# Patient Record
Sex: FEMALE | Race: BLACK OR AFRICAN AMERICAN | NOT HISPANIC OR LATINO | Employment: FULL TIME
[De-identification: names, ages, dates, MRNs, and addresses within clinical notes are randomized per-mention and may not be internally consistent; named-entity substitution may affect disease eponyms.]

---

## 2022-06-04 ENCOUNTER — HEALTH MAINTENANCE LETTER (OUTPATIENT)
Age: 32
End: 2022-06-04

## 2022-09-22 ENCOUNTER — OFFICE VISIT (OUTPATIENT)
Dept: OPTOMETRY | Facility: CLINIC | Age: 32
End: 2022-09-22
Payer: COMMERCIAL

## 2022-09-22 DIAGNOSIS — H52.13 MYOPIA OF BOTH EYES: ICD-10-CM

## 2022-09-22 DIAGNOSIS — H52.221 REGULAR ASTIGMATISM OF RIGHT EYE: ICD-10-CM

## 2022-09-22 DIAGNOSIS — Z01.00 ENCOUNTER FOR EXAMINATION OF EYES AND VISION WITHOUT ABNORMAL FINDINGS: Primary | ICD-10-CM

## 2022-09-22 PROCEDURE — 92004 COMPRE OPH EXAM NEW PT 1/>: CPT | Performed by: OPTOMETRIST

## 2022-09-22 PROCEDURE — 92015 DETERMINE REFRACTIVE STATE: CPT | Performed by: OPTOMETRIST

## 2022-09-22 ASSESSMENT — REFRACTION_MANIFEST
OD_AXIS: 012
OD_SPHERE: PLANO
OS_CYLINDER: SPHERE
OD_AXIS: 016
OS_AXIS: 171
OD_CYLINDER: +0.50
OS_SPHERE: +0.25
OD_SPHERE: -0.50
OS_CYLINDER: +0.50
OD_CYLINDER: +0.75
METHOD_AUTOREFRACTION: 1
OS_SPHERE: -0.50

## 2022-09-22 ASSESSMENT — VISUAL ACUITY
OS_SC: 20/20-1
OD_SC: 20/20
METHOD: SNELLEN - LINEAR
OD_SC: 20/25
OS_SC: 20/25
OD_SC+: -1

## 2022-09-22 ASSESSMENT — TONOMETRY
IOP_METHOD: APPLANATION
OD_IOP_MMHG: 20
OS_IOP_MMHG: 20

## 2022-09-22 ASSESSMENT — EXTERNAL EXAM - RIGHT EYE: OD_EXAM: NORMAL

## 2022-09-22 ASSESSMENT — SLIT LAMP EXAM - LIDS
COMMENTS: NORMAL
COMMENTS: NORMAL

## 2022-09-22 ASSESSMENT — KERATOMETRY
OS_K1POWER_DIOPTERS: 44.50
OS_AXISANGLE_DEGREES: 088
OD_K1POWER_DIOPTERS: 45.25
OD_AXISANGLE2_DEGREES: 163
OS_K2POWER_DIOPTERS: 45.00
METHOD_AUTO_MANUAL: AUTOMATED
OD_K2POWER_DIOPTERS: 45.75
OD_AXISANGLE_DEGREES: 073
OS_AXISANGLE2_DEGREES: 178

## 2022-09-22 ASSESSMENT — CUP TO DISC RATIO
OD_RATIO: 0.55
OS_RATIO: 0.5

## 2022-09-22 ASSESSMENT — EXTERNAL EXAM - LEFT EYE: OS_EXAM: NORMAL

## 2022-09-22 ASSESSMENT — CONF VISUAL FIELD
METHOD: COUNTING FINGERS
OS_NORMAL: 1
OD_NORMAL: 1

## 2022-09-22 NOTE — PROGRESS NOTES
"Chief Complaint   Patient presents with     Annual Eye Exam         Last Eye Exam: 1-2 years - [facility]   Dilated Previously: Yes, side effects of dilation explained today - has not had eyes dilated in a long time - will need temp suns    What are you currently using to see?  does not use glasses or contacts - lost glasses ~3 months ago - wore full time        Distance Vision Acuity: Satisfied with vision - notices \"fireworks\" while driving at night    Near Vision Acuity: Not satisfied - black print on white paper fatigues her eyes - makes it harder to focus    Eye Comfort: good  Do you use eye drops? : No  Occupation or Hobbies: Studying for [employer], [employer]     \[facility]\""       Medical, surgical and family histories reviewed and updated 9/22/2022.       OBJECTIVE: See Ophthalmology exam    ASSESSMENT:    ICD-10-CM    1. Encounter for examination of eyes and vision without abnormal findings  Z01.00    2. Myopia of both eyes  H52.13    3. Regular astigmatism of right eye  H52.221        PLAN:     Patient Instructions   France was advised of today's exam findings.  Fill glasses prescription  Allow 2 weeks to adapt to change in glasses  Copy of glasses Rx provided today.    Return in 2 years for eye exam, or sooner if needed.    The effects of the dilating drops last for 4- 6 hours.  You will be more sensitive to light and vision will be blurry up close.  Mydriatic sunglasses were given if needed.       Venkat Singh O.D.  [address] [place]  [address]    [phone]         "

## 2022-09-22 NOTE — PATIENT INSTRUCTIONS
France was advised of today's exam findings.  Fill glasses prescription  Allow 2 weeks to adapt to change in glasses  Copy of glasses Rx provided today.    Return in 2 years for eye exam, or sooner if needed.    The effects of the dilating drops last for 4- 6 hours.  You will be more sensitive to light and vision will be blurry up close.  Mydriatic sunglasses were given if needed.       Venkat Singh O.D.  [facility] [address]  [place]  [address]    [phone]

## 2022-09-22 NOTE — LETTER
"    9/22/2022         RE: France Garcia  [address]  [place] [address]        Dear Colleague,    Thank you for referring your patient, France Garcia, to the [facility]. Please see a copy of my visit note below.    Chief Complaint   Patient presents with     Annual Eye Exam         Last Eye Exam: 1-2 years - [facility]   Dilated Previously: Yes, side effects of dilation explained today - has not had eyes dilated in a long time - will need temp suns    What are you currently using to see?  does not use glasses or contacts - lost glasses ~3 months ago - wore full time        Distance Vision Acuity: Satisfied with vision - notices \"fireworks\" while driving at night    Near Vision Acuity: Not satisfied - black print on white paper fatigues her eyes - makes it harder to focus    Eye Comfort: good  Do you use eye drops? : No  Occupation or Hobbies: Studying for [employer], [employer]     [employer]       Medical, surgical and family histories reviewed and updated 9/22/2022.       OBJECTIVE: See Ophthalmology exam    ASSESSMENT:    ICD-10-CM    1. Encounter for examination of eyes and vision without abnormal findings  Z01.00    2. Myopia of both eyes  H52.13    3. Regular astigmatism of right eye  H52.221        PLAN:     Patient Instructions   France was advised of today's exam findings.  Fill glasses prescription  Allow 2 weeks to adapt to change in glasses  Copy of glasses Rx provided today.    Return in 2 years for eye exam, or sooner if needed.    The effects of the dilating drops last for 4- 6 hours.  You will be more sensitive to light and vision will be blurry up close.  Mydriatic sunglasses were given if needed.       Venkat Singh O.D.  [facility]  [address] [place] [address]. [place], [place]  [id number] [phone]             Again, thank you for allowing me to participate in the care of your patient.        Sincerely,        Venkat Singh, OD    "

## 2022-10-07 ENCOUNTER — NURSE TRIAGE (OUTPATIENT)
Dept: NURSING | Facility: CLINIC | Age: 32
End: 2022-10-07

## 2022-10-07 ENCOUNTER — OFFICE VISIT (OUTPATIENT)
Dept: URGENT CARE | Facility: URGENT CARE | Age: 32
End: 2022-10-07
Payer: COMMERCIAL

## 2022-10-07 VITALS
BODY MASS INDEX: 26.81 KG/M2 | DIASTOLIC BLOOD PRESSURE: 67 MMHG | HEIGHT: 65 IN | TEMPERATURE: 99.8 F | WEIGHT: 160.9 LBS | SYSTOLIC BLOOD PRESSURE: 111 MMHG | OXYGEN SATURATION: 98 % | HEART RATE: 118 BPM

## 2022-10-07 DIAGNOSIS — U07.1 INFECTION DUE TO 2019 NOVEL CORONAVIRUS: Primary | ICD-10-CM

## 2022-10-07 DIAGNOSIS — J45.909 ASTHMA, UNSPECIFIED ASTHMA SEVERITY, UNSPECIFIED WHETHER COMPLICATED, UNSPECIFIED WHETHER PERSISTENT: ICD-10-CM

## 2022-10-07 PROCEDURE — 99204 OFFICE O/P NEW MOD 45 MIN: CPT | Mod: CS | Performed by: PHYSICIAN ASSISTANT

## 2022-10-07 RX ORDER — ACETAMINOPHEN 325 MG/1
325-650 TABLET ORAL EVERY 6 HOURS PRN
COMMUNITY

## 2022-10-07 RX ORDER — ALBUTEROL SULFATE 90 UG/1
1-2 AEROSOL, METERED RESPIRATORY (INHALATION) EVERY 6 HOURS PRN
Qty: 18 G | Refills: 0 | Status: SHIPPED | OUTPATIENT
Start: 2022-10-07

## 2022-10-07 NOTE — PROGRESS NOTES
Chief Complaint   Patient presents with     Suspected Covid     Covid Positive.      Fever     Generalized Body Aches                     ASSESSMENT:     ICD-10-CM    1. Infection due to 2019 novel coronavirus  U07.1 nirmatrelvir and ritonavir (PAXLOVID) therapy pack     albuterol (PROAIR HFA/PROVENTIL HFA/VENTOLIN HFA) 108 (90 Base) MCG/ACT inhaler   2. Asthma, unspecified asthma severity, unspecified whether complicated, unspecified whether persistent  J45.909 nirmatrelvir and ritonavir (PAXLOVID) therapy pack     albuterol (PROAIR HFA/PROVENTIL HFA/VENTOLIN HFA) 108 (90 Base) MCG/ACT inhaler           PLAN: Positive COVID and asthma patient with current wheezing.  Discussed risks and/or benefits of Paxlovid treatment.  Wishes to proceed.  Denies any history of liver or kidney issues.  Prescription for it sent to [facility] pharmacy.  Also prescription for albuterol inhaler.  Side effects of medications discussed.  Symptomatic care is discussed.  I have discussed the possibility of  worsening symptoms and indication to RTC or go to the ER if they occur.  All questions are answered, patient indicates understanding of these issues and is in agreement with plan.   Patient care instructions are discussed/given at the end of visit.   Lots of rest and fluids.      Janice Iniguez PA-C      SUBJECTIVE:  32-year-old female vaccinated against COVID presents for a cute onset of fever, body aches last night.  Positive home COVID test today.  History of asthma.  Does not currently have an inhaler.  Does hear wheezing in her lungs.      Allergies   Allergen Reactions     Penicillins Rash and Unknown     Other reaction(s): Edema       No past medical history on file.    acetaminophen (TYLENOL) 325 MG tablet, Take 325-650 mg by mouth every 6 hours as needed for mild pain    No current facility-administered medications on file prior to visit.      Social History     Tobacco Use     Smoking status: Light Tobacco Smoker  "    Types: Cigars     Smokeless tobacco: Never Used   Substance Use Topics     Alcohol use: Yes     Comment: Social       ROS:  CONSTITUTIONAL: Negative for fatigue or fever.  EYES: Negative for eye problems.  ENT: As above.  RESP: As above.  CV: Negative for chest pains.  GI: Negative for vomiting.  MUSCULOSKELETAL:  Negative for significant muscle or joint pains.  NEUROLOGIC: Negative for headaches.  SKIN: Negative for rash.  PSYCH: Normal mentation for age.    OBJECTIVE:  /67 (BP Location: Left arm, Patient Position: Sitting, Cuff Size: Adult Regular)   Pulse 118   Temp 99.8  F (37.7  C) (Tympanic)   Ht 1.651 m (5' 5\")   Wt 73 kg (160 lb 14.4 oz)   SpO2 98%   BMI 26.78 kg/m    GENERAL APPEARANCE: Healthy, alert and no distress.  EYES:Conjunctiva/sclera clear.  EARS: No cerumen.   Ear canals w/o erythema.  TM's intact w/o erythema.    NOSE/MOUTH: Nose without ulcers, erythema or lesions.  SINUSES: No maxillary sinus tenderness.  THROAT: No erythema w/o tonsillar enlargement . No exudates.  NECK: Supple, nontender, no lymphadenopathy.  RESP: Lungs with expiratory wheezes throughout  CV: Regular rate and rhythm, normal S1 S2, no murmur noted.  NEURO: Awake, alert    SKIN: No rashes        Janice Iniguez PA-C    "

## 2022-10-07 NOTE — TELEPHONE ENCOUNTER
Symptoms started yesterday    Is having chest pain and pressure is also feeling short of breath    Is feeling short of breath when walking around - ok when sitting    Chest pain is about a 4/10 - almost like an asthma attack - really tight - on the verge of not being able to catch her breath all the tight    Color looks fine - no blue or gray face or lips    Advised that she be seen in [facility] [place]    Guerda Yang RN  [place] Nurse Advisor  1:23 PM  10/7/2022        Reason for Disposition    Chest pain or pressure  (Exception: MILD central chest pain, present only when coughing)    Additional Information    Negative: SEVERE difficulty breathing (e.g., struggling for each breath, speaks in single words)    Negative: Difficult to awaken or acting confused (e.g., disoriented, slurred speech)    Negative: Bluish (or gray) lips or face now    Negative: Shock suspected (e.g., cold/pale/clammy skin, too weak to stand, low BP, rapid pulse)    Negative: Sounds like a life-threatening emergency to the triager    Negative: SEVERE or constant chest pain or pressure  (Exception: Mild central chest pain, present only when coughing.)    Negative: MODERATE difficulty breathing (e.g., speaks in phrases, SOB even at rest, pulse 100-120)    Negative: Headache and stiff neck (can't touch chin to chest)    Negative: Oxygen level (e.g., pulse oximetry) 90 percent or lower    Protocols used: CORONAVIRUS (COVID-19) DIAGNOSED OR [id number]

## 2022-10-08 NOTE — TELEPHONE ENCOUNTER
Nurse Triage SBAR    Is this a 2nd Level Triage? NO    Situation: France states she is concerned as she has had a temperature fluctuating between 99.8-102.6.     Background: France called stating she just came home from the pharmacy with a prescription for PAXLOVID after a dx of COVID.     Assessment:  She is c/o a fever, intermittent cough, chills, stiff neck, weakness, and a HA. Her current temp is 102.1.    Protocol Recommended Disposition:   Home Care, See More Appropriate Guideline    Recommendation: Dispo for home care. Care advice discussed with pt who states understanding. Also discussed when to come back in to the hospital or call back.    Reason for Disposition    [1] Fever AND [2] within 14 days of COVID-19 Exposure    [1] Fever AND [2] no signs of serious infection or localizing symptoms (all other triage questions negative)    [1] COVID-19 diagnosed by positive lab test (e.g., PCR, rapid self-test kit) AND [2] mild symptoms (e.g., cough, fever, others) AND [3] no complications or SOB    Additional Information    Negative: Sounds like a life-threatening emergency to the triager    Negative: Shock suspected (e.g., cold/pale/clammy skin, too weak to stand, low BP, rapid pulse)    Negative: Difficult to awaken or acting confused (e.g., disoriented, slurred speech)    Negative: [1] Difficulty breathing AND [2] bluish lips, tongue or face    Negative: New-onset rash with multiple purple (or blood-colored) spots or dots    Negative: [1] Headache AND [2] stiff neck (can't touch chin to chest)    Negative: Difficulty breathing    Negative: IV Drug Use (IVDU)    Negative: [1] Drinking very little AND [2] dehydration suspected (e.g., no urine > 12 hours, very dry mouth, very lightheaded)    Negative: Widespread rash and cause unknown    Negative: Patient sounds very sick or weak to the triager  (Exception: mild weakness and hasn't taken fever medicine)    Negative: Fever > 104 F (40 C)    Negative: [1] Fever > 101  F (38.3 C) AND [2] age > 60 years    Negative: [1] Fever > 100.0 F (37.8 C) AND [2] bedridden (e.g., nursing home patient, CVA, chronic illness, recovering from surgery)    Negative: [1] Fever > 100.0 F (37.8 C) AND [2] indwelling urinary catheter (e.g., Jesus, Coude)    Negative: [1] Fever > 100.0 F (37.8 C) AND [2] has port (portacath), central line, or PICC line    Negative: [1] Fever > 100.0 F (37.8 C) AND [2] diabetes mellitus or weak immune system (e.g., HIV positive, cancer chemo, splenectomy, organ transplant, chronic steroids)    Negative: [1] Fever > 100.0 F (37.8 C) AND [2] surgery in the last month    Negative: Transplant patient (e.g., kidney, liver, lung, heart)    Negative: Severe chills (i.e., feeling extremely cold WITH shaking chills)    Negative: Fever present > 3 days (72 hours)    Negative: [1] Fever > 100.0 F (37.8 C) AND [2] foreign travel to a developing country in the last month    Negative: [1] Intermittent fever > 100.0 F (37.8 C) AND [2] lasts > 3 weeks    Negative: SEVERE difficulty breathing (e.g., struggling for each breath, speaks in single words)    Negative: Difficult to awaken or acting confused (e.g., disoriented, slurred speech)    Negative: Bluish (or gray) lips or face now    Negative: Shock suspected (e.g., cold/pale/clammy skin, too weak to stand, low BP, rapid pulse)    Negative: Sounds like a life-threatening emergency to the triager    Negative: SEVERE or constant chest pain or pressure  (Exception: Mild central chest pain, present only when coughing.)    Negative: MODERATE difficulty breathing (e.g., speaks in phrases, SOB even at rest, pulse 100-120)    Negative: [1] Headache AND [2] stiff neck (can't touch chin to chest)    Negative: Oxygen level (e.g., pulse oximetry) 90 percent or lower    Negative: Chest pain or pressure    Negative: Patient sounds very sick or weak to the triager    Negative: MILD difficulty breathing (e.g., minimal/no SOB at rest, SOB with walking,  pulse <100)    Negative: Fever > 103 F (39.4 C)    Negative: [1] Fever > 101 F (38.3 C) AND [2] age > 60 years    Negative: [1] Fever > 100.0 F (37.8 C) AND [2] bedridden (e.g., nursing home patient, CVA, chronic illness, recovering from surgery)    Negative: HIGH RISK for severe COVID complications (e.g., weak immune system, age > 64 years, obesity with BMI > 25, pregnant, chronic lung disease or other chronic medical condition)  (Exception: Already seen by PCP and no new or worsening symptoms.)    Negative: [1] HIGH RISK patient AND [2] influenza is widespread in the community AND [3] ONE OR MORE respiratory symptoms: cough, sore throat, runny or stuffy nose    Negative: [1] HIGH RISK patient AND [2] influenza exposure within the last 7 days AND [3] ONE OR MORE respiratory symptoms: cough, sore throat, runny or stuffy nose    Negative: Oxygen level (e.g., pulse oximetry) 91 to 94 percent    Negative: Fever present > 3 days (72 hours)    Negative: [1] Fever returns after gone for over 24 hours AND [2] symptoms worse or not improved    Negative: [1] Continuous (nonstop) coughing interferes with work or school AND [2] no improvement using cough treatment per Care Advice    Negative: [1] COVID-19 infection suspected by caller or triager AND [2] mild symptoms (cough, fever, or others) AND [3] negative COVID-19 rapid test    Negative: Cough present > 3 weeks    Protocols used: FEVER-A-AH, CORONAVIRUS (COVID-19) DIAGNOSED OR [id number] 1.18.2022    Hazel Suarez RN  [facility] Nurse Advisor   10/7/2022  7:34 PM

## 2022-10-10 ENCOUNTER — HEALTH MAINTENANCE LETTER (OUTPATIENT)
Age: 32
End: 2022-10-10

## 2023-02-28 ENCOUNTER — E-VISIT (OUTPATIENT)
Dept: FAMILY MEDICINE | Facility: CLINIC | Age: 33
End: 2023-02-28

## 2023-02-28 DIAGNOSIS — B37.31 CANDIDAL VULVOVAGINITIS: Primary | ICD-10-CM

## 2023-02-28 PROCEDURE — 99421 OL DIG E/M SVC 5-10 MIN: CPT | Performed by: PHYSICIAN ASSISTANT

## 2023-02-28 RX ORDER — FLUCONAZOLE 150 MG/1
150 TABLET ORAL ONCE
Qty: 1 TABLET | Refills: 0 | Status: SHIPPED | OUTPATIENT
Start: 2023-02-28 | End: 2023-02-28

## 2023-02-28 NOTE — PATIENT INSTRUCTIONS
Thank you for choosing us for your care. I have placed an order for a prescription so that you can start treatment. View your full visit summary for details by clicking on the link below. Your pharmacist will able to address any questions you may have about the medication.     If you re not feeling better within 2-3 days, please schedule an appointment.  You can [place] right [place], or call [phone]  If the visit is for the same symptoms as your eVisit, we ll refund the cost of your eVisit if seen within seven days.      Yeast Infection (Candida Vaginal Infection)    You have a Candida vaginal infection. This is also known as a yeast infection. It's most often caused by a type of yeast (fungus) called Candida. Candida are normally found in the vagina. But if they increase in number, this can lead to infection and cause symptoms.   Symptoms of a yeast infection can include:     Clumpy or thin, white discharge, which may look like cottage cheese    Itching or burning    Burning with urination  Certain factors can make a yeast infection more likely. These can include:     Taking certain medicines, such as antibiotics or birth control pills    Pregnancy    Diabetes    Weak immune system  A yeast infection is most often treated with antifungal medicine. This may be given as a vaginal cream or pills you take by mouth. Treatment may last for about 1 to 7 days. Women with severe or recurrent infections may need longer courses of treatment.   Home care    If you re prescribed medicine, be sure to use it as directed. Finish all of the medicine, even if your symptoms go away. Don t try to treat yourself using over-the-counter products without talking with your provider first. They will let you know if this is a good option for you.    Ask your provider what steps you can take to help reduce your risk of having a yeast infection in the future.    Follow-up care  Follow up with your healthcare  provider, or as directed.   When to seek medical advice  Call your healthcare provider right away if:     You have a fever of 100.4 F (38 C) or higher, or as directed by your provider.    Your symptoms worsen, or they don t go away within a few days of starting treatment.    You have new pain in the lower belly or pelvic region.    You have side effects that bother you or a reaction to the cream or pills you re prescribed.    You or any partners you have sex with have new symptoms, such as a rash, joint pain, or sores.  [employer] last reviewed this educational content on 7/1/2020 2000-2021 [employer]. All rights reserved. This information is not intended as a substitute for professional medical care. Always follow your healthcare professional's instructions.

## 2023-06-10 ENCOUNTER — HEALTH MAINTENANCE LETTER (OUTPATIENT)
Age: 33
End: 2023-06-10

## 2024-07-16 ENCOUNTER — DOCUMENTATION ONLY (OUTPATIENT)
Dept: TRANSPLANT | Facility: CLINIC | Age: 34
End: 2024-07-16

## 2024-07-16 ENCOUNTER — TELEPHONE (OUTPATIENT)
Dept: TRANSPLANT | Facility: CLINIC | Age: 34
End: 2024-07-16

## 2024-07-16 NOTE — TELEPHONE ENCOUNTER
"Donor Intake Start:5/3/24Donor Intake Complete:5/3/24  Expiration Date:24  Gender:FemalePreferred Language:English  Full Name:France Medrano  Needed:[not answered]  Preferred Name:France  Phone Number:[phone] Phone:  Contact Preference:[not answered]Best Contact Time:Noon - 2pm  Emergency Contact:Abhijit AgustinMichelle Contact #:[phone]  Relationship to Contact:Contact is my spouse  :[birth year]Age:33  Country:[place]  [address] [address] [place]  [id number]  Height:5'5\"Weight:165lbs  BMI:27.5  Employment Status:EmployedHas PTO for donation?No, not reimbursed  Occupation:School bus driverRequires Heavy Lifting?  No  Education Level:High SchoolMarital Status:  Exercise Routine:OccasionalHealth Insurance:  Yes  Blood Type:UnknownEthnicity/Race:Black or African American  Donor Type:Standard Voucher Donor  Prefer Remote Donation:[not answered]  Physician:N/A  KOSTAS Arceo  Motivation to donate:  It's my mother. I will do anything to make sure she is well  Living Donor Pre-Screening  Is In [place]  Yes  Will Accept Blood Transfusions?  Yes  Has been Diagnosed with Kidney Disease?  No  Has had a Heart Attack?  No  Has Diabetes?  No  Has had Cancer?  No  Has had Kidney Stones?  No  Has ever been Pregnant?  Yes    - Is Currently Pregnant?  No    - Months Since Pregnancy?24+    - Is Currently Nursing?  No    - Gestational Diabetes?  No    - Hypertension during pregnancy?  Never  Is Planning on Pregnancy?  No  Is Taking Birth Control?  No  Has Used Tobacco  Yes    - Currently uses Tobacco?  Yes    - Will Stop for Surgery?  Yes    - How Many Years:15    - Tobacco use (packs/cans) / Frequency:1 / Weekly  Has HIV?  No  Is Currently Incarcerated?  No  Is Currently Residing in [place]  Yes  History Misc  Has Allergies?  Yes  Allergy  Penicillin  Pears  Has had Surgeries?  Yes  Surgery When  ACL replacement 2007  Appendix removal 2002  Takes " Medication?  No  Medical History  History of High BP?  Never  Has History Of CABG (bypass surgery)?  No  History of Blood Clots?  Unknown  History of Coronary Disease?  Never  Has Stents Implanted?  No  Has History of Chest Pain with Exercise?  No  Has History of Chest Pain at Other Times?  No  Results of Climbing 2 Flights of Stairs?No Problem  Has had Stress Test within Last Year?  No  Has had Stroke?  No  Has had Leg Bypass?  No  History of Lung Disease?  Never  History of COPD?  Never  History of TB?  Never    - Is TB Active?[not answered]  History of Pneumonia?  Never  Has Respiratory Issues?  Yes    - Respiratory Issues:Asthma  Has Gastro Issues?  No  History of Gallstones?  Never  History of Pancreatitis?  Never  History of Liver Disease?  Never  History of Hepatitis B?  Never    - Is Hep B Active?[not answered]  History of Hepatitis C?  Never  History of Bleeding Problem?  Never  History of UTIs?  Unknown  History of Kidney Damage?  Never  History of Proteinuria?  Never  History of Hematuria?  Never  History of Neuro Disease?  Never  History of Seizure?  Never  History of Lupus?  Never  History of Paralysis?  Never  History of Arthritis?  Never  History of Neuropathy?  Never  History of Depression?  Never  History of Anxiety?  Never  History of Documented Psychiatric Illness?  Never  History of Fibroid Uterus?  Never  History of Endometriosis?  Never  History of Polycystic Ovaries?  Never  Has had Miscarriages?  Yes    - # of Miscarriages:1    - Had Late Term Miscarriage?  No  Has had Abortions?  No  Has had Transfusions?  Unknown  History of Obesity?  No  History of Fabry's Disease?  No  History of Sickle Cell Disease?  No  History of Sickle Cell Trait?  No  History of Sarcoidosis?  No  History of Auto-Immune Disease  No  Has had Physical Exam?  Yes    - how many years ago:1  Has had Mammogram?  No    - how many years ago:  Has had Pap Smear?  Yes    - how many years ago:3  Has had Colonoscopy?  No  Medical  History Comments?[no comments]  Living Donor Family Medical History  Anyone with kidney disease?  Unknown  Anyone with liver disease?  Unknown  Anyone with heart disease?  Unknown  Anyone with coronary artery disease?  Unknown  Anyone with high blood pressure?  Unknown  Anyone with blood disorder?  Unknown  Anyone with cancer?  Yes    - which family members:Grandfather  Anyone with kidney cancer?  Unknown  Anyone with diabetes?  Yes    - which family members:Grandmother  Is mother alive?  Yes  Mother's age?56  Is father alive?  Yes  Father's age?60  How many siblings?3  How many adult children?0  How many children under 18?0  Social History  Has Used Alcohol?  Yes    - currently uses alcohol:  Yes    - how much:2/Daily  Has Abused Alcohol?  No  Has Used Drugs?  Yes  Drugs UsedLast UsedGot Treatment?  [id number]+ years  Yes     Has had legal issues w/ law enforcement?  Yes  Has been sentenced to penitentiary?  No  Traveled over 100 miles from home in last year?  Yes    - Traveled Where?[place]  Has had suicidal thoughts or attempts in the last five years?  No

## 2024-07-18 ENCOUNTER — TELEPHONE (OUTPATIENT)
Dept: TRANSPLANT | Facility: CLINIC | Age: 34
End: 2024-07-18

## 2024-07-18 NOTE — TELEPHONE ENCOUNTER
Initial Independent Living Donor Advocate contact attempted today.  No answer.  Left VM to contact the transplant center to reschedule if she remains interested in being evaluated as a kidney donor for her mother.

## 2024-07-19 ENCOUNTER — DOCUMENTATION ONLY (OUTPATIENT)
Dept: TRANSPLANT | Facility: CLINIC | Age: 34
End: 2024-07-19

## 2024-08-03 ENCOUNTER — HEALTH MAINTENANCE LETTER (OUTPATIENT)
Age: 34
End: 2024-08-03

## 2024-12-12 ENCOUNTER — DOCUMENTATION ONLY (OUTPATIENT)
Dept: TRANSPLANT | Facility: CLINIC | Age: 34
End: 2024-12-12

## 2024-12-16 ENCOUNTER — DOCUMENTATION ONLY (OUTPATIENT)
Dept: TRANSPLANT | Facility: CLINIC | Age: 34
End: 2024-12-16

## 2024-12-18 ENCOUNTER — TELEPHONE (OUTPATIENT)
Dept: TRANSPLANT | Facility: CLINIC | Age: 34
End: 2024-12-18

## 2024-12-18 NOTE — TELEPHONE ENCOUNTER
Initial Independent Living Donor Advocate contact made with potential donor today.  I introduced myself and my role during the donation process, including:   INDERJIT ROLE   The federal government requires that all licensed transplant centers provide the living donor with an Independent Living Donor Advocate (INDERJIT).  I do not meet recipients or attend meetings that discuss their care or decision to transplant them. My role is separate to avoid any conflict of interest.  My role is to ensure:  1) your rights are protected;  2) you get all the information you need from the transplant team to make a fully informed decision whether to donate;   3) that living donation is in your best interest.   4) that you have the right to decide NOT to go forward with living donation at any time during this process.  I am available to you throughout the workup, during surgery phase and follow-up at home.     WORKUP & PRIVACY   Your identity and workup are not shared with the recipient at any time.   The recipient's insurance covers the medical expenses related to the donor evaluation and surgery.  However, it is important that you carry your own health insurance to address any medical issues that are found and are NOT related to living donation.  Additionally, age appropriate cancer screening (I.e. mammograms,  colonoscopies, etc) is required and would be through your insurance.  There is a psychosocial and medical donor workup that consists of testing to determine if you are healthy enough to donate. Workup tests include tissue typing/genetics, many blood draws, urine collection/ (kidney function testing), chest x-ray, EKG/other heart testing, CT scan. Age appropriate cancer screening is required and would be through your insurance. As you complete each step then you may move on to the next.  Workup can take as little or as long as you need and you can stop the process at any time. Transplant is a treatment option, not a cure. A kidney  from a living kidney donor can last 12-14 years.  Other treatment options are  donation and two types of dialysis.   This is major surgery and your estimated hospital stay is approximately 1-2 nights.  After surgery, there are driving and lifting restrictions - no driving for two weeks and no lifting over ten pounds for 8 weeks.  Donors are routinely off from work for 4 - 6 weeks after surgery, and potentially longer if they have a physical job.     If you anticipate lost wages due to donation, donor wage reimbursement options may be available to you and will be reviewed with you during the evaluation process. Donor Shield and NLDAC explained.  We reviewed the importance of completing follow-up labs and surveys at six months, 1 year and 2 years after donation to monitor kidney health and the impact donation has had on their life post donation.        QUESTIONS    Have you received the Welcome e-mail that includes copies of the informed consent, financial letter, information on donor shield and NLDAC from the transplant department? Yes.    Have you discussed with anyone your potential decision to donate?   Yes.    Is anyone pressuring or coercing you to donate? No.    Have you discussed any financial arrangements with recipient around donating a kidney? No.    Are you aware that you can confidentially opt out at any time, up to and including day of donation? Yes.    At this time, would you like to proceed with the medical evaluation to see if you can be a kidney donor?  Yes.    If yes, I will make an appointment for your donor coordinator to reach out to you with next steps.     Contact information for INDERJIT's was provided Yes.    Demographic Information:   Preferred Name: France  Preferred Pronouns:she/her  Race/Ethnicity: Black or     Peg Newton- [phone]  Emily Harris-  [phone]    Confirmed that France Garcia reviewed Informed consent document and all questions answered.   Reviewed that they will receive Docusign to obtain electronic signature for the following: Informed consent, SRTR data, JESSICA for medical information, Auth for Electronic communication, Kidney [facility] Life and will need their signed consent back before proceeding with evaluation.     Time frame for donation: tbd  Paired exchange was introduced  Yes.      MyChart was initiated  Yes.  CareEverywhere was initiated Yes.    INDERJIT NOTES: France is interested in donating a kidney to her mother.  Wife supportive.  Would like more information about pregnancy after donation.  She is a .  Appt with Robyn Ying scheduled for Monday, 1/23/24 at 1:45 PM      Duration of call 35 minutes

## 2024-12-23 ENCOUNTER — TELEPHONE (OUTPATIENT)
Dept: TRANSPLANT | Facility: CLINIC | Age: 34
End: 2024-12-23

## 2025-05-14 SDOH — HEALTH STABILITY: PHYSICAL HEALTH: ON AVERAGE, HOW MANY DAYS PER WEEK DO YOU ENGAGE IN MODERATE TO STRENUOUS EXERCISE (LIKE A BRISK WALK)?: 0 DAYS

## 2025-05-14 SDOH — HEALTH STABILITY: PHYSICAL HEALTH: ON AVERAGE, HOW MANY MINUTES DO YOU ENGAGE IN EXERCISE AT THIS LEVEL?: 0 MIN

## 2025-05-14 ASSESSMENT — ASTHMA QUESTIONNAIRES
QUESTION_2 LAST FOUR WEEKS HOW OFTEN HAVE YOU HAD SHORTNESS OF BREATH: THREE TO SIX TIMES A WEEK
QUESTION_3 LAST FOUR WEEKS HOW OFTEN DID YOUR ASTHMA SYMPTOMS (WHEEZING, COUGHING, SHORTNESS OF BREATH, CHEST TIGHTNESS OR PAIN) WAKE YOU UP AT NIGHT OR EARLIER THAN USUAL IN THE MORNING: TWO OR THREE NIGHTS A WEEK
QUESTION_1 LAST FOUR WEEKS HOW MUCH OF THE TIME DID YOUR ASTHMA KEEP YOU FROM GETTING AS MUCH DONE AT WORK, SCHOOL OR AT HOME: SOME OF THE TIME
ACT_TOTALSCORE: 12
QUESTION_5 LAST FOUR WEEKS HOW WOULD YOU RATE YOUR ASTHMA CONTROL: SOMEWHAT CONTROLLED
EMERGENCY_ROOM_LAST_YEAR_TOTAL: ONE
QUESTION_4 LAST FOUR WEEKS HOW OFTEN HAVE YOU USED YOUR RESCUE INHALER OR NEBULIZER MEDICATION (SUCH AS ALBUTEROL): THREE OR MORE TIMES PER DAY

## 2025-05-14 ASSESSMENT — SOCIAL DETERMINANTS OF HEALTH (SDOH): HOW OFTEN DO YOU GET TOGETHER WITH FRIENDS OR RELATIVES?: ONCE A WEEK

## 2025-05-19 ENCOUNTER — OFFICE VISIT (OUTPATIENT)
Dept: FAMILY MEDICINE | Facility: CLINIC | Age: 35
End: 2025-05-19
Payer: COMMERCIAL

## 2025-05-19 VITALS
WEIGHT: 185.2 LBS | DIASTOLIC BLOOD PRESSURE: 87 MMHG | TEMPERATURE: 98.4 F | RESPIRATION RATE: 16 BRPM | SYSTOLIC BLOOD PRESSURE: 125 MMHG | HEIGHT: 65 IN | OXYGEN SATURATION: 100 % | HEART RATE: 82 BPM | BODY MASS INDEX: 30.86 KG/M2

## 2025-05-19 DIAGNOSIS — Z13.1 SCREENING FOR DIABETES MELLITUS: ICD-10-CM

## 2025-05-19 DIAGNOSIS — Z12.4 CERVICAL CANCER SCREENING: ICD-10-CM

## 2025-05-19 DIAGNOSIS — Z11.59 NEED FOR HEPATITIS C SCREENING TEST: ICD-10-CM

## 2025-05-19 DIAGNOSIS — Z11.3 SCREENING EXAMINATION FOR STI: ICD-10-CM

## 2025-05-19 DIAGNOSIS — N63.11 MASS OF UPPER OUTER QUADRANT OF RIGHT BREAST: ICD-10-CM

## 2025-05-19 DIAGNOSIS — Z00.00 ROUTINE GENERAL MEDICAL EXAMINATION AT A HEALTH CARE FACILITY: Primary | ICD-10-CM

## 2025-05-19 DIAGNOSIS — Z11.4 SCREENING FOR HIV (HUMAN IMMUNODEFICIENCY VIRUS): ICD-10-CM

## 2025-05-19 DIAGNOSIS — J45.21 MILD INTERMITTENT ASTHMA WITH ACUTE EXACERBATION: ICD-10-CM

## 2025-05-19 DIAGNOSIS — Z13.220 LIPID SCREENING: ICD-10-CM

## 2025-05-19 LAB
ALBUMIN SERPL BCG-MCNC: 4.1 G/DL (ref 3.5–5.2)
ALP SERPL-CCNC: 88 U/L (ref 40–150)
ALT SERPL W P-5'-P-CCNC: 31 U/L (ref 0–50)
ANION GAP SERPL CALCULATED.3IONS-SCNC: 10 MMOL/L (ref 7–15)
AST SERPL W P-5'-P-CCNC: 35 U/L (ref 0–45)
BILIRUB SERPL-MCNC: 0.2 MG/DL
BUN SERPL-MCNC: 5.6 MG/DL (ref 6–20)
CALCIUM SERPL-MCNC: 9.5 MG/DL (ref 8.8–10.4)
CHLORIDE SERPL-SCNC: 104 MMOL/L (ref 98–107)
CHOLEST SERPL-MCNC: 160 MG/DL
CREAT SERPL-MCNC: 0.74 MG/DL (ref 0.51–0.95)
EGFRCR SERPLBLD CKD-EPI 2021: >90 ML/MIN/1.73M2
FASTING STATUS PATIENT QL REPORTED: NO
FASTING STATUS PATIENT QL REPORTED: NO
GLUCOSE SERPL-MCNC: 88 MG/DL (ref 70–99)
HBV SURFACE AG SERPL QL IA: NONREACTIVE
HCO3 SERPL-SCNC: 24 MMOL/L (ref 22–29)
HCV AB SERPL QL IA: NONREACTIVE
HDLC SERPL-MCNC: 44 MG/DL
HIV 1+2 AB+HIV1 P24 AG SERPL QL IA: NONREACTIVE
LDLC SERPL CALC-MCNC: 89 MG/DL
NONHDLC SERPL-MCNC: 116 MG/DL
POTASSIUM SERPL-SCNC: 4.1 MMOL/L (ref 3.4–5.3)
PROT SERPL-MCNC: 7.5 G/DL (ref 6.4–8.3)
SODIUM SERPL-SCNC: 138 MMOL/L (ref 135–145)
T PALLIDUM AB SER QL: NONREACTIVE
TRIGL SERPL-MCNC: 137 MG/DL

## 2025-05-19 PROCEDURE — 36415 COLL VENOUS BLD VENIPUNCTURE: CPT

## 2025-05-19 PROCEDURE — 80061 LIPID PANEL: CPT

## 2025-05-19 PROCEDURE — 3079F DIAST BP 80-89 MM HG: CPT

## 2025-05-19 PROCEDURE — 87389 HIV-1 AG W/HIV-1&-2 AB AG IA: CPT

## 2025-05-19 PROCEDURE — 3074F SYST BP LT 130 MM HG: CPT

## 2025-05-19 PROCEDURE — 86780 TREPONEMA PALLIDUM: CPT

## 2025-05-19 PROCEDURE — 99213 OFFICE O/P EST LOW 20 MIN: CPT | Mod: 25

## 2025-05-19 PROCEDURE — 80053 COMPREHEN METABOLIC PANEL: CPT

## 2025-05-19 PROCEDURE — 90677 PCV20 VACCINE IM: CPT

## 2025-05-19 PROCEDURE — G2211 COMPLEX E/M VISIT ADD ON: HCPCS

## 2025-05-19 PROCEDURE — 86803 HEPATITIS C AB TEST: CPT

## 2025-05-19 PROCEDURE — 99395 PREV VISIT EST AGE 18-39: CPT | Mod: 25

## 2025-05-19 PROCEDURE — 90471 IMMUNIZATION ADMIN: CPT

## 2025-05-19 PROCEDURE — 87340 HEPATITIS B SURFACE AG IA: CPT

## 2025-05-19 RX ORDER — AZITHROMYCIN 250 MG/1
TABLET, FILM COATED ORAL
COMMUNITY
Start: 2025-05-16

## 2025-05-19 ASSESSMENT — ANXIETY QUESTIONNAIRES
GAD7 TOTAL SCORE: 1
5. BEING SO RESTLESS THAT IT IS HARD TO SIT STILL: NOT AT ALL
GAD7 TOTAL SCORE: 1
GAD7 TOTAL SCORE: 1
3. WORRYING TOO MUCH ABOUT DIFFERENT THINGS: SEVERAL DAYS
IF YOU CHECKED OFF ANY PROBLEMS ON THIS QUESTIONNAIRE, HOW DIFFICULT HAVE THESE PROBLEMS MADE IT FOR YOU TO DO YOUR WORK, TAKE CARE OF THINGS AT HOME, OR GET ALONG WITH OTHER PEOPLE: NOT DIFFICULT AT ALL
7. FEELING AFRAID AS IF SOMETHING AWFUL MIGHT HAPPEN: NOT AT ALL
4. TROUBLE RELAXING: NOT AT ALL
6. BECOMING EASILY ANNOYED OR IRRITABLE: NOT AT ALL
7. FEELING AFRAID AS IF SOMETHING AWFUL MIGHT HAPPEN: NOT AT ALL
1. FEELING NERVOUS, ANXIOUS, OR ON EDGE: NOT AT ALL
2. NOT BEING ABLE TO STOP OR CONTROL WORRYING: NOT AT ALL
8. IF YOU CHECKED OFF ANY PROBLEMS, HOW DIFFICULT HAVE THESE MADE IT FOR YOU TO DO YOUR WORK, TAKE CARE OF THINGS AT HOME, OR GET ALONG WITH OTHER PEOPLE?: NOT DIFFICULT AT ALL

## 2025-05-19 NOTE — PROGRESS NOTES
Preventive Care Visit  [facility] Revel[facility] CNP, Family Medicine  May 19, 2025      Assessment & Plan     Routine general medical examination at a health care facility    Mild intermittent asthma with acute exacerbation  Well controlled, currently recovering from exacerbation 2/2 pneumonia   -Continue medications as previously prescribed     Mass of upper outer quadrant of right breast  Recently noted.  - MA Diagnostic Bilateral w/ Víctor  - US Breast Right Complete 4 Quadrants    Screening for diabetes mellitus  - Comprehensive metabolic panel (BMP + Alb, Alk Phos, ALT, AST, Total. Bili, TP)    Cervical cancer screening  Pt declines today. Agrees to reschedule     Routine Screening examination for STI  - Chlamydia trachomatis PCR Urine  - Neisseria gonorrhoeae PCR Urine [[id number]]  - Hepatitis B surface antigen  - Hepatitis C Screen Reflex to HCV RNA Quant and Genotype  - HIV Antigen Antibody Combo Cascade  - Treponema Abs w Reflex to RPR and Titer    Lipid screening  - Lipid panel reflex to direct LDL Non-fasting    Counseling  Appropriate preventive services were addressed with this patient via screening, questionnaire, or discussion as appropriate for fall prevention, nutrition, physical activity, Tobacco-use cessation, social engagement, weight loss and cognition.  Checklist reviewing preventive services available has been given to the patient.  Reviewed patient's diet, addressing concerns and/or questions.     Follow-up for routine Pap as discussed      Subjective   France is a 34 year old, presenting for the following:  Physical (Annual/Felt a lump in rt breast)    Patient presents for routine annual.  Largely doing well.  Reports mental health doing well.  Currently trying to conceive with donor, would like routine STI screening.    Patient acutely concerned for breast lump.  Noted to right side upper outer quadrant.  Nontender.  No overlying skin change or nipple discharge.   Reports it may have strength since the start of her menstrual cycle but is acutely concerned as she has had several members with breast cancer.        5/19/2025    10:21 AM   Additional Questions   Roomed by cayla            Advance Care Planning    Discussed advance care planning with patient; however, patient declined at this time.        5/14/2025   General Health   How would you rate your overall physical health? Good   Feel stress (tense, anxious, or unable to sleep) Not at all         5/14/2025   Nutrition   Three or more servings of calcium each day? Yes   Diet: Regular (no restrictions)   How many servings of fruit and vegetables per day? 4 or more   How many sweetened beverages each day? (!) 2         5/14/2025   Exercise   Days per week of moderate/strenous exercise 0 days   Average minutes spent exercising at this level 0 min         5/14/2025   Social Factors   Frequency of gathering with friends or relatives Once a week   Worry food won't last until get money to buy more No   Food not last or not have enough money for food? No   Do you have housing? (Housing is defined as stable permanent housing and does not include staying outside in a car, in a tent, in an abandoned building, in an overnight shelter, or couch-surfing.) Yes   Are you worried about losing your housing? No   Lack of transportation? No   Unable to get utilities (heat,electricity)? No         5/14/2025   Dental   Dentist two times every year? Yes         Today's PHQ-2 Score:       5/19/2025     9:24 AM   PHQ-2 ( 1999 [employer])   Q1: Little interest or pleasure in doing things 0   Q2: Feeling down, depressed or hopeless 0   PHQ-2 Score 0    Q1: Little interest or pleasure in doing things Not at all   Q2: Feeling down, depressed or hopeless Not at all   PHQ-2 Score 0       Patient-reported           5/14/2025   Substance Use   Alcohol more than 3/day or more than 7/wk No   Do you use any other substances recreationally? (!) CANNABIS PRODUCTS  "    Social History     Tobacco Use    Smoking status: Former     Types: Cigars    Smokeless tobacco: Never   Vaping Use    Vaping status: Never Used   Substance Use Topics    Alcohol use: Yes     Comment: Social    Drug use: Yes     Types: Marijuana     Comment: last used 15+ years ago       5/14/2025   One time HIV Screening   Previous HIV test? Yes         5/14/2025   STI Screening   New sexual partner(s) since last STI/HIV test? (!) YES      History of abnormal Pap smear: No known- age 30- 64 PAP with HPV every 5 years recommended             5/14/2025   Contraception/Family Planning   Questions about contraception or family planning (!) YES           Objective    Exam  LMP 05/16/2025 (Exact Date)    Estimated body mass index is 26.78 kg/m  as calculated from the following:    Height as of 10/7/22: 1.651 m (5' 5\").    Weight as of 10/7/22: 73 kg (160 lb 14.4 oz).    Physical Exam  GENERAL: alert and no distress  NECK: no adenopathy, no asymmetry, masses, or scars  RESP: lungs clear to auscultation - no rales, rhonchi or wheezes  CV: regular rate and rhythm, normal S1 S2, no S3 or S4, no murmur, click or rub, no peripheral edema  MS: no gross musculoskeletal defects noted, no edema        Signed Electronically by: PAULA Nova CNP    The longitudinal plan of care for the diagnosis(es)/condition(s) as documented were addressed during this visit. Due to the added complexity in care, I will continue to support France in the subsequent management and with ongoing continuity of care.    Answers submitted by the patient for this visit:  Patient Health Questionnaire (G7) (Submitted on 5/19/2025)  BREANNE 7 TOTAL SCORE: 1    "

## 2025-05-19 NOTE — PATIENT INSTRUCTIONS
Patient Education   Preventive Care Advice   This is general advice given by our system to help you stay healthy. However, your care team may have specific advice just for you. Please talk to your care team about your preventive care needs.  Nutrition  Eat 5 or more servings of fruits and vegetables each day.  Try wheat bread, brown rice and whole grain pasta (instead of white bread, rice, and pasta).  Get enough calcium and vitamin D. Check the label on foods and aim for 100% of the RDA (recommended daily allowance).  Lifestyle  Exercise at least 150 minutes each week  (30 minutes a day, 5 days a week).  Do muscle strengthening activities 2 days a week. These help control your weight and prevent disease.  No smoking.  Wear sunscreen to prevent skin cancer.  Have a dental exam and cleaning every 6 months.  Yearly exams  See your health care team every year to talk about:  Any changes in your health.  Any medicines your care team has prescribed.  Preventive care, family planning, and ways to prevent chronic diseases.  Shots (vaccines)   HPV shots (up to age 26), if you've never had them before.  Hepatitis B shots (up to age 59), if you've never had them before.  COVID-19 shot: Get this shot when it's due.  Flu shot: Get a flu shot every year.  Tetanus shot: Get a tetanus shot every 10 years.  Pneumococcal, hepatitis A, and RSV shots: Ask your care team if you need these based on your risk.  Shingles shot (for age 50 and up)  General health tests  Diabetes screening:  Starting at age 35, Get screened for diabetes at least every 3 years.  If you are younger than age 35, ask your care team if you should be screened for diabetes.  Cholesterol test: At age 39, start having a cholesterol test every 5 years, or more often if advised.  Bone density scan (DEXA): At age 50, ask your care team if you should have this scan for osteoporosis (brittle bones).  Hepatitis C: Get tested at least once in your life.  STIs (sexually  transmitted infections)  Before age 24: Ask your care team if you should be screened for STIs.  After age 24: Get screened for STIs if you're at risk. You are at risk for STIs (including HIV) if:  You are sexually active with more than one person.  You don't use condoms every time.  You or a partner was diagnosed with a sexually transmitted infection.  If you are at risk for HIV, ask about PrEP medicine to prevent HIV.  Get tested for HIV at least once in your life, whether you are at risk for HIV or not.  Cancer screening tests  Cervical cancer screening: If you have a cervix, begin getting regular cervical cancer screening tests starting at age 21.  Breast cancer scan (mammogram): If you've ever had breasts, begin having regular mammograms starting at age 40. This is a scan to check for breast cancer.  Colon cancer screening: It is important to start screening for colon cancer at age 45.  Have a colonoscopy test every 10 years (or more often if you're at risk) Or, ask your provider about stool tests like a FIT test every year or Cologuard test every 3 years.  To learn more about your testing options, visit:   .  For help making a decision, visit:   https://bit.ly/nr46982.  Prostate cancer screening test: If you have a prostate, ask your care team if a prostate cancer screening test (PSA) at age 55 is right for you.  Lung cancer screening: If you are a current or former smoker ages 50 to 80, ask your care team if ongoing lung cancer screenings are right for you.  For informational purposes only. Not to replace the advice of your health care provider. Copyright   2023 [facility]. All rights reserved. Clinically reviewed by the [facility] Transitions Program. [employer] 134246 - REV 01/24.  Substance Use Disorder: Care Instructions  Overview     You can improve your life and health by stopping your use of alcohol or drugs. When you don't drink or use drugs, you may feel and sleep better. You may  get along better with your family, friends, and coworkers. There are medicines and programs that can help with substance use disorder.  How can you care for yourself at home?  Here are some ways to help you stay sober and prevent relapse.  If you have been given medicine to help keep you sober or reduce your cravings, be sure to take it exactly as prescribed.  Talk to your doctor about programs that can help you stop using drugs or drinking alcohol.  Do not keep alcohol or drugs in your home.  Plan ahead. Think about what you'll say if other people ask you to drink or use drugs. Try not to spend time with people who drink or use drugs.  Use the time and money spent on drinking or drugs to do something that's important to you.  Preventing a relapse  Have a plan to deal with relapse. Learn to recognize changes in your thinking that lead you to drink or use drugs. Get help before you start to drink or use drugs again.  Try to stay away from situations, friends, or places that may lead you to drink or use drugs.  If you feel the need to drink alcohol or use drugs again, seek help right away. Call a trusted friend or family member. Some people get support from organizations such as Narcotics Anonymous or [employer] or from treatment facilities.  If you relapse, get help as soon as you can. Some people make a plan with another person that outlines what they want that person to do for them if they relapse. The plan usually includes how to handle the relapse and who to notify in case of relapse.  Don't give up. Remember that a relapse doesn't mean that you have failed. Use the experience to learn the triggers that lead you to drink or use drugs. Then quit again. Recovery is a lifelong process. Many people have several relapses before they are able to quit for good.  Follow-up care is a key part of your treatment and safety. Be sure to make and go to all appointments, and call your doctor if you are having problems. It's  "also a good idea to know your test results and keep a list of the medicines you take.  When should you call for help?   Call 911  anytime you think you may need emergency care. For example, call if you or someone else:    Has overdosed or has withdrawal signs. Be sure to tell the emergency workers that you are or someone else is using or trying to quit using drugs. Overdose or withdrawal signs may include:  Losing consciousness.  Seizure.  Seeing or hearing things that aren't there (hallucinations).     Is thinking or talking about suicide or harming others.   Where to get help 24 hours a day, 7 days a week   If you or someone you know talks about suicide, self-harm, a mental health crisis, a substance use crisis, or any other kind of emotional distress, get help right away. You can:    Call the Suicide and Crisis Lifeline at 988.     Call 0-521-858-TALK (1-144.827.7575).     Text HOME to 878131 to access the Crisis Text Line.   Consider saving these numbers in your phone.  Go to TMS for more information or to chat online.  Call your doctor now or seek immediate medical care if:    You are having withdrawal symptoms. These may include nausea or vomiting, sweating, shakiness, and anxiety.   Watch closely for changes in your health, and be sure to contact your doctor if:    You have a relapse.     You need more help or support to stop.   Where can you learn more?  Go to https://www.FirstBest.net/patiented  Enter H573 in the search box to learn more about \"Substance Use Disorder: Care Instructions.\"  Current as of: August 20, 2024  Content Version: 14.4    8956-5611 [employer].   Care instructions adapted under license by your healthcare professional. If you have questions about a medical condition or this instruction, always ask your healthcare professional. [employer] disclaims any warranty or liability for your use of this information.    Safer Sex: Care " Instructions  Overview  Safer sex is a way to reduce your risk of getting a sexually transmitted infection (STI). It can also help prevent pregnancy.  Several products can help you practice safer sex and reduce your chance of STIs. One of the best is a condom. There are internal and external condoms. You can use a special rubber sheet (dental dam) for protection during oral sex. Disposable gloves can keep your hands from touching blood, semen, or other body fluids that can carry infections.  Remember that birth control methods such as diaphragms, IUDs, foams, and birth control pills do not stop you from getting STIs.  Follow-up care is a key part of your treatment and safety. Be sure to make and go to all appointments, and call your doctor if you are having problems. It's also a good idea to know your test results and keep a list of the medicines you take.  How can you care for yourself at home?  Think about getting vaccinated to help prevent hepatitis A, hepatitis B, and human papillomavirus (HPV). They can be spread through sex.  Use a condom every time you have sex. Use an external condom, which goes on the penis. Or use an internal condom, which goes into the vagina or anus.  Make sure you use the right size external condom. A condom that's too small can break easily. A condom that's too big can slip off during sex.  Use a new condom each time you have sex. Be careful not to poke a hole in the condom when you open the wrapper.  Don't use an internal condom and an external condom at the same time.  Never use petroleum jelly (such as Vaseline), grease, hand lotion, baby oil, or anything with oil in it. These products can make holes in the condom.  After intercourse, hold the edge of the condom as you remove it. This will help keep semen from spilling out of the condom.  Do not have sex with anyone who has symptoms of an STI, such as sores on the genitals or mouth.  Do not drink a lot of alcohol or use drugs before  "sex.  Limit your sex partners. Sex with one partner who has sex only with you can reduce your risk of getting an STI.  Don't share sex toys. But if you do share them, use a condom and clean the sex toys between each use.  Talk to any partners before you have sex. Talk about what you feel comfortable with and whether you have any boundaries with sex. And find out if your partner or partners may be at risk for any STI. Keep in mind that a person may be able to spread an STI even if they do not have symptoms. You and any partners may want to get tested for STIs.  Where can you learn more?  Go to https://www.[employer].net/patiented  Enter B608 in the search box to learn more about \"Safer Sex: Care Instructions.\"  Current as of: April 30, 2024  Content Version: 14.4    8441-3141 [employer].   Care instructions adapted under license by your healthcare professional. If you have questions about a medical condition or this instruction, always ask your healthcare professional. [employer] disclaims any warranty or liability for your use of this information.       "

## 2025-05-20 ENCOUNTER — LAB (OUTPATIENT)
Dept: LAB | Facility: CLINIC | Age: 35
End: 2025-05-20
Payer: COMMERCIAL

## 2025-05-20 DIAGNOSIS — Z11.3 SCREENING EXAMINATION FOR STI: ICD-10-CM

## 2025-05-20 LAB
C TRACH DNA SPEC QL PROBE+SIG AMP: NEGATIVE
N GONORRHOEA DNA SPEC QL NAA+PROBE: NEGATIVE
SPECIMEN TYPE: NORMAL

## 2025-05-20 PROCEDURE — 87591 N.GONORRHOEAE DNA AMP PROB: CPT

## 2025-05-20 PROCEDURE — 87491 CHLMYD TRACH DNA AMP PROBE: CPT

## 2025-05-21 ENCOUNTER — ANCILLARY PROCEDURE (OUTPATIENT)
Dept: MAMMOGRAPHY | Facility: CLINIC | Age: 35
End: 2025-05-21
Payer: COMMERCIAL

## 2025-05-21 ENCOUNTER — RESULTS FOLLOW-UP (OUTPATIENT)
Dept: FAMILY MEDICINE | Facility: CLINIC | Age: 35
End: 2025-05-21

## 2025-05-21 ENCOUNTER — ANCILLARY PROCEDURE (OUTPATIENT)
Dept: ULTRASOUND IMAGING | Facility: CLINIC | Age: 35
End: 2025-05-21
Payer: COMMERCIAL

## 2025-05-21 DIAGNOSIS — N63.11 MASS OF UPPER OUTER QUADRANT OF RIGHT BREAST: ICD-10-CM

## 2025-05-21 PROCEDURE — 77066 DX MAMMO INCL CAD BI: CPT | Performed by: STUDENT IN AN ORGANIZED HEALTH CARE EDUCATION/TRAINING PROGRAM

## 2025-05-21 PROCEDURE — G0279 TOMOSYNTHESIS, MAMMO: HCPCS | Performed by: STUDENT IN AN ORGANIZED HEALTH CARE EDUCATION/TRAINING PROGRAM

## 2025-05-23 ENCOUNTER — ANCILLARY PROCEDURE (OUTPATIENT)
Dept: MAMMOGRAPHY | Facility: CLINIC | Age: 35
End: 2025-05-23
Payer: COMMERCIAL

## 2025-05-23 ENCOUNTER — ANCILLARY PROCEDURE (OUTPATIENT)
Dept: ULTRASOUND IMAGING | Facility: CLINIC | Age: 35
End: 2025-05-23
Payer: COMMERCIAL

## 2025-05-23 DIAGNOSIS — N63.11 MASS OF UPPER OUTER QUADRANT OF RIGHT BREAST: ICD-10-CM

## 2025-05-23 PROCEDURE — 19083 BX BREAST 1ST LESION US IMAG: CPT | Mod: LT

## 2025-05-27 ENCOUNTER — TELEPHONE (OUTPATIENT)
Dept: GENERAL RADIOLOGY | Facility: CLINIC | Age: 35
End: 2025-05-27
Payer: COMMERCIAL

## 2025-05-27 LAB
PATH REPORT.COMMENTS IMP SPEC: NORMAL
PATH REPORT.COMMENTS IMP SPEC: NORMAL
PATH REPORT.FINAL DX SPEC: NORMAL
PATH REPORT.GROSS SPEC: NORMAL
PATH REPORT.MICROSCOPIC SPEC OTHER STN: NORMAL
PATH REPORT.RELEVANT HX SPEC: NORMAL
PHOTO IMAGE: NORMAL

## 2025-05-27 NOTE — TELEPHONE ENCOUNTER
Spoke with patient regarding left breast biopsy results, which indicate fibroadenoma; negative for atypia and malignancy. Notified patient that the radiologist's recommendation is routine yearly mammography beginning at age 40. Patient verbalized understanding of these results and all questions answered to her satisfaction.

## 2025-07-30 ENCOUNTER — PATIENT OUTREACH (OUTPATIENT)
Dept: FAMILY MEDICINE | Facility: CLINIC | Age: 35
End: 2025-07-30
Payer: COMMERCIAL

## 2025-07-30 NOTE — LETTER
July 30, 2025    France Brentonjuliette Garcia    [address] [place] [address]    Hello,     Your care team at [facility] values your health and well-being. After reviewing your chart, we have identified recommendation(s) to help you better manage your health.    It's time for a follow-up visit to manage your Cervical Cancer Screening - Pap smear    If you recently had or are having any of these services soon, please contact the clinic via phone or [employer] so that your care team can update your records.    We look forward to seeing you at your upcoming visit.    If you have any questions or concerns, please contact our clinic. Thank you for continuing to trust us with your care.    Sincerely,    Your [facility] Care Team            Electronically signed

## 2025-07-30 NOTE — TELEPHONE ENCOUNTER
Patient Quality Outreach    Patient is due for the following:   Cervical Cancer Screening - PAP Needed    Action(s) Taken:   Schedule a office visit for PAP    Type of outreach:    Sent iLogon message.    Questions for provider review:    None         Ernestina Esposito CMA  Chart routed to None.